# Patient Record
Sex: FEMALE | Race: WHITE
[De-identification: names, ages, dates, MRNs, and addresses within clinical notes are randomized per-mention and may not be internally consistent; named-entity substitution may affect disease eponyms.]

---

## 2017-01-15 ENCOUNTER — HOSPITAL ENCOUNTER (EMERGENCY)
Dept: HOSPITAL 62 - ER | Age: 52
LOS: 1 days | Discharge: HOME | End: 2017-01-16
Payer: COMMERCIAL

## 2017-01-15 DIAGNOSIS — R11.0: ICD-10-CM

## 2017-01-15 DIAGNOSIS — E78.00: ICD-10-CM

## 2017-01-15 DIAGNOSIS — R10.9: ICD-10-CM

## 2017-01-15 DIAGNOSIS — Z90.710: ICD-10-CM

## 2017-01-15 DIAGNOSIS — K21.9: ICD-10-CM

## 2017-01-15 DIAGNOSIS — K59.03: ICD-10-CM

## 2017-01-15 DIAGNOSIS — I10: ICD-10-CM

## 2017-01-15 DIAGNOSIS — R07.89: Primary | ICD-10-CM

## 2017-01-15 LAB
APPEARANCE UR: CLEAR
BASOPHILS # BLD AUTO: 0.1 10^3/UL (ref 0–0.2)
BASOPHILS NFR BLD AUTO: 1.2 % (ref 0–2)
BILIRUB UR QL STRIP: NEGATIVE
EOSINOPHIL # BLD AUTO: 0.3 10^3/UL (ref 0–0.6)
EOSINOPHIL NFR BLD AUTO: 4 % (ref 0–6)
ERYTHROCYTE [DISTWIDTH] IN BLOOD BY AUTOMATED COUNT: 13.4 % (ref 11.5–14)
GLUCOSE UR STRIP-MCNC: NEGATIVE MG/DL
HCT VFR BLD CALC: 39.5 % (ref 36–47)
HGB BLD-MCNC: 12.7 G/DL (ref 12–15.5)
HGB HCT DIFFERENCE: -1.4
KETONES UR STRIP-MCNC: NEGATIVE MG/DL
LYMPHOCYTES # BLD AUTO: 2.5 10^3/UL (ref 0.5–4.7)
LYMPHOCYTES NFR BLD AUTO: 30.8 % (ref 13–45)
MCH RBC QN AUTO: 28.8 PG (ref 27–33.4)
MCHC RBC AUTO-ENTMCNC: 32.2 G/DL (ref 32–36)
MCV RBC AUTO: 90 FL (ref 80–97)
MONOCYTES # BLD AUTO: 0.6 10^3/UL (ref 0.1–1.4)
MONOCYTES NFR BLD AUTO: 8 % (ref 3–13)
NEUTROPHILS # BLD AUTO: 4.5 10^3/UL (ref 1.7–8.2)
NEUTS SEG NFR BLD AUTO: 56 % (ref 42–78)
NITRITE UR QL STRIP: NEGATIVE
PH UR STRIP: 7 [PH] (ref 5–9)
PROT UR STRIP-MCNC: NEGATIVE MG/DL
RBC # BLD AUTO: 4.41 10^6/UL (ref 3.72–5.28)
SP GR UR STRIP: 1.01
UROBILINOGEN UR-MCNC: NEGATIVE MG/DL (ref ?–2)
WBC # BLD AUTO: 8.1 10^3/UL (ref 4–10.5)

## 2017-01-15 PROCEDURE — 81001 URINALYSIS AUTO W/SCOPE: CPT

## 2017-01-15 PROCEDURE — 96374 THER/PROPH/DIAG INJ IV PUSH: CPT

## 2017-01-15 PROCEDURE — 74022 RADEX COMPL AQT ABD SERIES: CPT

## 2017-01-15 PROCEDURE — 96361 HYDRATE IV INFUSION ADD-ON: CPT

## 2017-01-15 PROCEDURE — 99285 EMERGENCY DEPT VISIT HI MDM: CPT

## 2017-01-15 PROCEDURE — 87086 URINE CULTURE/COLONY COUNT: CPT

## 2017-01-15 PROCEDURE — 82550 ASSAY OF CK (CPK): CPT

## 2017-01-15 PROCEDURE — 96375 TX/PRO/DX INJ NEW DRUG ADDON: CPT

## 2017-01-15 PROCEDURE — 82553 CREATINE MB FRACTION: CPT

## 2017-01-15 PROCEDURE — 80307 DRUG TEST PRSMV CHEM ANLYZR: CPT

## 2017-01-15 PROCEDURE — 84484 ASSAY OF TROPONIN QUANT: CPT

## 2017-01-15 PROCEDURE — 83690 ASSAY OF LIPASE: CPT

## 2017-01-15 PROCEDURE — 93010 ELECTROCARDIOGRAM REPORT: CPT

## 2017-01-15 PROCEDURE — 85379 FIBRIN DEGRADATION QUANT: CPT

## 2017-01-15 PROCEDURE — 36415 COLL VENOUS BLD VENIPUNCTURE: CPT

## 2017-01-15 PROCEDURE — 85025 COMPLETE CBC W/AUTO DIFF WBC: CPT

## 2017-01-15 PROCEDURE — 80053 COMPREHEN METABOLIC PANEL: CPT

## 2017-01-15 PROCEDURE — 83735 ASSAY OF MAGNESIUM: CPT

## 2017-01-15 PROCEDURE — 93005 ELECTROCARDIOGRAM TRACING: CPT

## 2017-01-15 NOTE — ER DOCUMENT REPORT
ED General





- General


Chief Complaint: Chest Pain


Stated Complaint: ABDOMINAL PAIN


TRAVEL OUTSIDE OF THE [place] IN LAST 30 DAYS: No





- HPI


Patient complains to provider of: chest pain abdominal pain


Notes: 


Patient coming in for right-sided chest pain abdominal pain starting at 1400 

hrs. today.  Patient states that time to come nitroglycerin tablet relief her 

pain was right-sided patient states pain return now feels like a band around 

her chest and states now she is nauseous.  Patient states she took 3 nitros at 

home 5 minutes apart with no relief in her pain therefore came to ER for 

further evaluation.  Otherwise patient denies a cardiac history denies stents 

by bypass denies any acute MI but states that her physician has prescribed her 

nitro drip home.  Patient does have a history of anxiety elevated blood 

pressure.  Patient states she's had a negative stress tests in the past however 

has been "many years.  Patient denies any trauma denies any recent travel 

denies shortness of breath denies diarrhea.  Patient does state she was 

constipatednoon and had to disimpact herself.





Patient initially states pain is very mild however during interview process the 

nurse attempted an IV stick stated that her chest pain increased to 8 out of 

10.  Pain was right-sided.





- Related Data


Allergies/Adverse Reactions: 


 





No Known Allergies Allergy (Verified 01/15/17 22:47)


 











Past Medical History





- Social History


Smoking Status: Unknown if Ever Smoked


Family History: CAD - Parents and grandparents in their 60s., CVA, Hypertension

, Other - Hypercoagulable state with blood clots





- Past Medical History


Cardiac Medical History: Reports: Hx Hypercholesterolemia, Hx Hypertension


Pulmonary Medical History: Reports: Hx Asthma


GI Medical History: Reports: Hx Gastroesophageal Reflux Disease


Psychiatric Medical History: Reports: Hx Anxiety, Hx Bipolar Disorder, Hx 

Depression


Past Surgical History: Reports: Hx Appendectomy, Hx Hysterectomy





- Immunizations


Hx Diphtheria, Pertussis, Tetanus Vaccination:  - UNKNOWN





Review of Systems





- Review of Systems


Constitutional: No symptoms reported


EENT: No symptoms reported


Cardiovascular: Chest pain - Right-sided


Respiratory: No symptoms reported


Gastrointestinal: Abdominal pain


Genitourinary: No symptoms reported


Female Genitourinary: No symptoms reported


Musculoskeletal: No symptoms reported


Skin: No symptoms reported


Hematologic/Lymphatic: No symptoms reported


Neurological/Psychological: No symptoms reported





Physical Exam





- Vital signs


Vitals: 


 











Resp Pulse Ox


 


 14   96 


 


 01/15/17 23:31  01/15/17 23:31











Interpretation: Normal





- General


General appearance: Appears well, Alert





- HEENT


Head: Normocephalic, Atraumatic


Eyes: Normal


Pupils: PERRL





- Respiratory


Respiratory status: No respiratory distress


Chest status: Nontender


Breath sounds: Normal


Chest palpation: Normal





- Cardiovascular


Rhythm: Regular


Heart sounds: Normal auscultation


Murmur: No





- Abdominal


Inspection: Normal


Distension: No distension


Bowel sounds: Normal


Tenderness: Nontender


Organomegaly: No organomegaly





- Back


Back: Normal, Nontender





- Extremities


General upper extremity: Normal inspection, Nontender, Normal color, Normal ROM

, Normal temperature


General lower extremity: Normal inspection, Nontender, Normal color, Normal ROM

, Normal temperature, Normal weight bearing.  No: Jeff's sign





- Neurological


Neuro grossly intact: Yes


Cognition: Normal


Orientation: AAOx4


Jax Coma Scale Eye Opening: Spontaneous


Jax Coma Scale Verbal: Oriented


[place] Coma Scale Motor: Obeys Commands


[place] Coma Scale Total: 15


Speech: Normal


Motor strength normal: LUE, RUE, LLE, RLE


Sensory: Normal





- Psychological


Associated symptoms: Normal affect, Normal mood





- Skin


Skin Temperature: Warm


Skin Moisture: Dry


Skin Color: Normal





Course





- Re-evaluation


Re-evalutation: 





01/16/17 00:59


Patient EKG troponin returned negative.  D-dimer is also negative.  Patient's x-

ray does show moderate stool burden large amount stool in the right upper 

quadrant and transverse colon.  More likely this is the reasons for the patient'

s pain.  Patient will be given a bottle of mag citrate encouraged to follow-up 

her primary care physician will be discharged home.





The patient presents with abdominal pain without signs of peritonitis or other 

life-threatening or serious etiology. The patient appears stable for discharge 

and has been instructed to return immediately if the symptoms worsen in any way

, or in 8-12hr if not improved for re-evaluation. The patient has been 

instructed to return if the symptoms worsen or change in any way.. 





The patient has atypical chest pain as the patient's chest pain is not 

suggestive of pulmonary embolus, cardiac ischemia, aortic dissection, or other 

serious etiology. Given the extremely low risk of these diagnoses further 

testing and evaluation for these possibilities does not appear to be indicated 

at this time. The patient has been instructed to return if the symptoms worsen 

or change in any way. 





- Vital Signs


Vital signs: 


 











Temp Pulse Resp BP Pulse Ox


 


       14      96 


 


       01/15/17 23:31     01/15/17 23:31














- Laboratory


Result Diagrams: 


 01/15/17 23:19





 01/15/17 23:19





Discharge





- Discharge


Clinical Impression: 


 Right-sided chest wall pain





Constipation


Qualifiers:


 Constipation type: drug induced constipation Qualified Code(s): K59.03 - Drug 

induced constipation





Condition: Good


Disposition: HOME, SELF-CARE


Instructions:  Chest Pain of Unclear Cause ([facility]), Chest Wall Pain ([facility]), 

Abdominal Pain ([facility]), Constipation ([facility])


Additional Instructions: 


Please drink the entire bottle of mag citrate to relieve your constipation.  

This may cause some abdominal cramping and nausea.  May take nausea medication 

as directed.  Please follow-up with your primary care physician.  Your 

laboratory today shows no serious etiology for your right-sided chest pain or 

your abdominal pain.


Forms:  Return to Work

## 2017-01-16 VITALS — SYSTOLIC BLOOD PRESSURE: 105 MMHG | DIASTOLIC BLOOD PRESSURE: 64 MMHG

## 2017-01-16 LAB
ALBUMIN SERPL-MCNC: 4.4 G/DL (ref 3.5–5)
ALP SERPL-CCNC: 68 U/L (ref 38–126)
ALT SERPL-CCNC: 27 U/L (ref 9–52)
ANION GAP SERPL CALC-SCNC: 12 MMOL/L (ref 5–19)
AST SERPL-CCNC: 19 U/L (ref 14–36)
BARBITURATES UR QL SCN: NEGATIVE
BILIRUB DIRECT SERPL-MCNC: 0 MG/DL (ref 0–0.3)
BILIRUB SERPL-MCNC: 0.7 MG/DL (ref 0.2–1.3)
BUN SERPL-MCNC: 16 MG/DL (ref 7–20)
CALCIUM: 9.8 MG/DL (ref 8.4–10.2)
CHLORIDE SERPL-SCNC: 101 MMOL/L (ref 98–107)
CK MB SERPL-MCNC: 1.03 NG/ML (ref ?–4.55)
CK SERPL-CCNC: 101 U/L (ref 30–135)
CO2 SERPL-SCNC: 30 MMOL/L (ref 22–30)
CREAT SERPL-MCNC: 0.88 MG/DL (ref 0.52–1.25)
GLUCOSE SERPL-MCNC: 90 MG/DL (ref 75–110)
LIPASE SERPL-CCNC: 143.1 U/L (ref 23–300)
MAGNESIUM SERPL-MCNC: 2 MG/DL (ref 1.6–2.3)
METHADONE UR QL SCN: NEGATIVE
PCP UR QL SCN: NEGATIVE
POTASSIUM SERPL-SCNC: 4.4 MMOL/L (ref 3.6–5)
PROT SERPL-MCNC: 6.9 G/DL (ref 6.3–8.2)
SODIUM SERPL-SCNC: 142.6 MMOL/L (ref 137–145)
TROPONIN I SERPL-MCNC: < 0.012 NG/ML

## 2018-04-30 ENCOUNTER — HOSPITAL ENCOUNTER (OUTPATIENT)
Dept: HOSPITAL 62 - ER | Age: 53
Setting detail: OBSERVATION
LOS: 2 days | Discharge: HOME | End: 2018-05-02
Attending: INTERNAL MEDICINE | Admitting: INTERNAL MEDICINE
Payer: COMMERCIAL

## 2018-04-30 DIAGNOSIS — I95.9: ICD-10-CM

## 2018-04-30 DIAGNOSIS — Z82.3: ICD-10-CM

## 2018-04-30 DIAGNOSIS — R51: ICD-10-CM

## 2018-04-30 DIAGNOSIS — Z90.49: ICD-10-CM

## 2018-04-30 DIAGNOSIS — Z87.891: ICD-10-CM

## 2018-04-30 DIAGNOSIS — F32.9: ICD-10-CM

## 2018-04-30 DIAGNOSIS — Z83.2: ICD-10-CM

## 2018-04-30 DIAGNOSIS — Z79.899: ICD-10-CM

## 2018-04-30 DIAGNOSIS — F44.81: ICD-10-CM

## 2018-04-30 DIAGNOSIS — R27.0: ICD-10-CM

## 2018-04-30 DIAGNOSIS — F13.20: ICD-10-CM

## 2018-04-30 DIAGNOSIS — Z82.49: ICD-10-CM

## 2018-04-30 DIAGNOSIS — R42: Primary | ICD-10-CM

## 2018-04-30 DIAGNOSIS — H53.8: ICD-10-CM

## 2018-04-30 DIAGNOSIS — F41.1: ICD-10-CM

## 2018-04-30 DIAGNOSIS — E78.5: ICD-10-CM

## 2018-04-30 DIAGNOSIS — Z84.89: ICD-10-CM

## 2018-04-30 DIAGNOSIS — H93.13: ICD-10-CM

## 2018-04-30 LAB
ADD MANUAL DIFF: NO
ALBUMIN SERPL-MCNC: 4 G/DL (ref 3.5–5)
ALP SERPL-CCNC: 76 U/L (ref 38–126)
ALT SERPL-CCNC: 34 U/L (ref 9–52)
ANION GAP SERPL CALC-SCNC: 11 MMOL/L (ref 5–19)
AST SERPL-CCNC: 19 U/L (ref 14–36)
BASOPHILS # BLD AUTO: 0.1 10^3/UL (ref 0–0.2)
BASOPHILS NFR BLD AUTO: 1.3 % (ref 0–2)
BILIRUB DIRECT SERPL-MCNC: 0.2 MG/DL (ref 0–0.4)
BILIRUB SERPL-MCNC: 0.4 MG/DL (ref 0.2–1.3)
BUN SERPL-MCNC: 14 MG/DL (ref 7–20)
CALCIUM: 9.7 MG/DL (ref 8.4–10.2)
CHLORIDE SERPL-SCNC: 104 MMOL/L (ref 98–107)
CK MB SERPL-MCNC: 0.67 NG/ML (ref ?–4.55)
CK SERPL-CCNC: 54 U/L (ref 30–135)
CO2 SERPL-SCNC: 27 MMOL/L (ref 22–30)
EOSINOPHIL # BLD AUTO: 0.2 10^3/UL (ref 0–0.6)
EOSINOPHIL NFR BLD AUTO: 3.3 % (ref 0–6)
ERYTHROCYTE [DISTWIDTH] IN BLOOD BY AUTOMATED COUNT: 13.8 % (ref 11.5–14)
GLUCOSE SERPL-MCNC: 119 MG/DL (ref 75–110)
HCT VFR BLD CALC: 39.4 % (ref 36–47)
HGB BLD-MCNC: 13.3 G/DL (ref 12–15.5)
INR PPP: 0.94
LIPASE SERPL-CCNC: 58.3 U/L (ref 23–300)
LYMPHOCYTES # BLD AUTO: 1.7 10^3/UL (ref 0.5–4.7)
LYMPHOCYTES NFR BLD AUTO: 26.3 % (ref 13–45)
MCH RBC QN AUTO: 29.1 PG (ref 27–33.4)
MCHC RBC AUTO-ENTMCNC: 33.7 G/DL (ref 32–36)
MCV RBC AUTO: 86 FL (ref 80–97)
MONOCYTES # BLD AUTO: 0.5 10^3/UL (ref 0.1–1.4)
MONOCYTES NFR BLD AUTO: 7.5 % (ref 3–13)
NEUTROPHILS # BLD AUTO: 4.1 10^3/UL (ref 1.7–8.2)
NEUTS SEG NFR BLD AUTO: 61.6 % (ref 42–78)
PLATELET # BLD: 173 10^3/UL (ref 150–450)
POTASSIUM SERPL-SCNC: 4.2 MMOL/L (ref 3.6–5)
PROT SERPL-MCNC: 6.7 G/DL (ref 6.3–8.2)
PROTHROMBIN TIME: 13.1 SEC (ref 11.4–15.4)
RBC # BLD AUTO: 4.56 10^6/UL (ref 3.72–5.28)
SODIUM SERPL-SCNC: 142.4 MMOL/L (ref 137–145)
TOTAL CELLS COUNTED % (AUTO): 100 %
TROPONIN I SERPL-MCNC: < 0.012 NG/ML
WBC # BLD AUTO: 6.6 10^3/UL (ref 4–10.5)

## 2018-04-30 PROCEDURE — 83690 ASSAY OF LIPASE: CPT

## 2018-04-30 PROCEDURE — 93010 ELECTROCARDIOGRAM REPORT: CPT

## 2018-04-30 PROCEDURE — 80053 COMPREHEN METABOLIC PANEL: CPT

## 2018-04-30 PROCEDURE — 93005 ELECTROCARDIOGRAM TRACING: CPT

## 2018-04-30 PROCEDURE — 85610 PROTHROMBIN TIME: CPT

## 2018-04-30 PROCEDURE — 81001 URINALYSIS AUTO W/SCOPE: CPT

## 2018-04-30 PROCEDURE — 70544 MR ANGIOGRAPHY HEAD W/O DYE: CPT

## 2018-04-30 PROCEDURE — 80307 DRUG TEST PRSMV CHEM ANLYZR: CPT

## 2018-04-30 PROCEDURE — 70450 CT HEAD/BRAIN W/O DYE: CPT

## 2018-04-30 PROCEDURE — 36415 COLL VENOUS BLD VENIPUNCTURE: CPT

## 2018-04-30 PROCEDURE — G0378 HOSPITAL OBSERVATION PER HR: HCPCS

## 2018-04-30 PROCEDURE — 82553 CREATINE MB FRACTION: CPT

## 2018-04-30 PROCEDURE — 85025 COMPLETE CBC W/AUTO DIFF WBC: CPT

## 2018-04-30 PROCEDURE — 96375 TX/PRO/DX INJ NEW DRUG ADDON: CPT

## 2018-04-30 PROCEDURE — 96374 THER/PROPH/DIAG INJ IV PUSH: CPT

## 2018-04-30 PROCEDURE — 84484 ASSAY OF TROPONIN QUANT: CPT

## 2018-04-30 PROCEDURE — 84439 ASSAY OF FREE THYROXINE: CPT

## 2018-04-30 PROCEDURE — 96361 HYDRATE IV INFUSION ADD-ON: CPT

## 2018-04-30 PROCEDURE — 70553 MRI BRAIN STEM W/O & W/DYE: CPT

## 2018-04-30 PROCEDURE — 84443 ASSAY THYROID STIM HORMONE: CPT

## 2018-04-30 PROCEDURE — 80048 BASIC METABOLIC PNL TOTAL CA: CPT

## 2018-04-30 PROCEDURE — 82550 ASSAY OF CK (CPK): CPT

## 2018-04-30 PROCEDURE — 83735 ASSAY OF MAGNESIUM: CPT

## 2018-04-30 PROCEDURE — 99285 EMERGENCY DEPT VISIT HI MDM: CPT

## 2018-04-30 NOTE — RADIOLOGY REPORT (SQ)
EXAM DESCRIPTION:  CT HEAD WITHOUT



COMPLETED DATE/TIME:  4/30/2018 9:15 pm



REASON FOR STUDY:  dizzy



COMPARISON:  None.



TECHNIQUE:  Axial images acquired through the brain without intravenous contrast.  Images reviewed wi
th bone, brain and subdural windows.  Additional sagittal and coronal reconstructions were generated.
 Images stored on PACS.

All CT scanners at this facility use dose modulation, iterative reconstruction, and/or weight based d
osing when appropriate to reduce radiation dose to as low as reasonably achievable (ALARA).

[facility]: Dose Right  [facility]: CareDose    [facility]: Dose Right    [facility]: Teradose 4D    [facility]: Smart Technologies



RADIATION DOSE:  CT Rad equipment meets quality standard of care and radiation dose reduction techniq
ues were employed. CTDIvol: 53.2 mGy. DLP: 1017 mGy-cm. mGy.



LIMITATIONS:  None.



FINDINGS:  VENTRICLES: Normal size and contour.

CEREBRUM: No masses.  No hemorrhage.  No midline shift.  No evidence for acute infarction. Normal gra
y/white matter differentiation. No areas of low density in the white matter.

CEREBELLUM: No masses.  No hemorrhage.  No alteration of density.  No evidence for acute infarction.

EXTRAAXIAL SPACES: No fluid collections.  No masses.

ORBITS AND GLOBE: No intra- or extraconal masses.  Normal contour of globe without masses.

CALVARIUM: No fracture.

PARANASAL SINUSES: No fluid or mucosal thickening.

SOFT TISSUES: No mass or hematoma.

OTHER: No other significant finding.



IMPRESSION:  NORMAL BRAIN CT WITHOUT CONTRAST.

EVIDENCE OF ACUTE STROKE: NO.



COMMENT:  Quality ID # [id number]: Final reports with documentation of one or more dose reduction techniques
 (e.g., Automated exposure control, adjustment of the mA and/or kV according to patient size, use of 
iterative reconstruction technique)



TECHNICAL DOCUMENTATION:  JOB ID:  [id number]

 2011 [employer]- All Rights Reserved



Reading location - IP/workstation name: RO

## 2018-04-30 NOTE — ER DOCUMENT REPORT
ED General





- General


TRAVEL OUTSIDE OF THE [place] IN LAST 30 DAYS: No





- HPI


Patient complains to provider of: Dizziness





<SEBASTIAN CORBIN - Last Filed: 04/30/18 23:27>





<MARCELO BEST - Last Filed: 05/01/18 02:58>





- General


Chief Complaint: Dizziness


Stated Complaint: DIZZINESS


Time Seen by Provider: 04/30/18 20:25





- HPI


Notes: 





History this morning.  Patient states also having a ringing in both ears.  

Patient has a history of having symptoms before denies history of vertigo or 

ringing in her ear.  Denies any recent travel or travel or swimming or diving.  

Denies any drainage from her ear or ear pain.  Patient states feels like wind 

blowing in her ears.  Patient states her dizziness is exacerbated with sitting 

up and movement.  Patient states he has been eating and drinking regularly.  

Patient also states has a past medical history positive for hypothyroidism 

depression multiple personalities.  Patient states that she is unaware when she 

had a today is that sometimes the other personalities will have meals that she 

is unaware of.  Denies fevers chills nausea vomiting diarrhea chest pain 

abdominal pain patient is resting healthy upon my evaluation. (SEBASTIAN CORBIN)





- Related Data


Allergies/Adverse Reactions: 


 





No Known Allergies Allergy (Verified 01/15/17 22:47)


 











Past Medical History





- Social History


Smoking Status: Former Smoker


Chew tobacco use (# tins/day): No


Frequency of alcohol use: None


Drug Abuse: None


Family History: CAD - Parents and grandparents in their 60s., CVA, Hypertension

, Other - Hypercoagulable state with blood clots


Patient has suicidal ideation: No


Patient has homicidal ideation: No





- Past Medical History


Cardiac Medical History: Reports: Hx Hypercholesterolemia, Hx Hypertension


Pulmonary Medical History: Reports: Hx Asthma


Renal/ Medical History: Denies: Hx Peritoneal Dialysis


GI Medical History: Reports: Hx Gastroesophageal Reflux Disease


Psychiatric Medical History: Reports: Hx Anxiety, Hx Bipolar Disorder, Hx 

Depression


Past Surgical History: Reports: Hx Appendectomy, Hx Hysterectomy





- Immunizations


Hx Diphtheria, Pertussis, Tetanus Vaccination:  - UNKNOWN





<SEBASTIAN CORBIN - Last Filed: 04/30/18 23:27>





Review of Systems





- Review of Systems


Constitutional: No symptoms reported, Weakness


EENT: No symptoms reported


Cardiovascular: No symptoms reported


Respiratory: No symptoms reported


Gastrointestinal: No symptoms reported


Genitourinary: No symptoms reported


Female Genitourinary: No symptoms reported


Musculoskeletal: No symptoms reported


Skin: No symptoms reported


Hematologic/Lymphatic: No symptoms reported


Neurological/Psychological: Other - Dizziness


-: Yes All other systems reviewed and negative





<SEBASTIAN CORBIN - Last Filed: 04/30/18 23:27>





Physical Exam





- Vital signs


Interpretation: Normal





- General


General appearance: Appears well, Alert





- HEENT


Head: Normocephalic, Atraumatic


Eyes: Normal


Conjunctiva: Normal


Cornea: Normal


Extraocular movements intact: Yes


Eyelashes: Normal


Pupils: PERRL


Ears: Normal


External canal: Normal


Tympanic membrane: Normal


Sinus: Normal


Nasal: Normal


Mouth/Lips: Normal


Mucous membranes: Normal


Pharynx: Normal


Neck: Normal





- Respiratory


Respiratory status: No respiratory distress


Chest status: Nontender


Breath sounds: Normal


Chest palpation: Normal





- Cardiovascular


Rhythm: Regular


Heart sounds: Normal auscultation


Murmur: No





- Abdominal


Inspection: Normal


Distension: No distension


Bowel sounds: Normal


Tenderness: Nontender


Organomegaly: No organomegaly





- Back


Back: Normal, Nontender





- Extremities


General upper extremity: Normal inspection, Nontender, Normal color, Normal ROM

, Normal temperature


General lower extremity: Normal inspection, Nontender, Normal color, Normal ROM

, Normal temperature, Normal weight bearing.  No: Jeff's sign





- Neurological


Neuro grossly intact: Yes


Cognition: Normal


Orientation: AAOx4


[place] Coma Scale Eye Opening: Spontaneous


[place] Coma Scale Verbal: Oriented


[place] Coma Scale Motor: Obeys Commands


[place] Coma Scale Total: 15


Speech: Normal


Cranial nerves: Normal


Motor strength normal: LUE, RUE, LLE, RLE


Sensory: Normal


Knee - Reflex grade: 2 = Normal





- Psychological


Associated symptoms: Normal affect, Normal mood





- Skin


Skin Temperature: Warm


Skin Moisture: Dry


Skin Color: Normal





<SEBASTIAN CORBIN - Last Filed: 04/30/18 23:27>





- Vital signs


Vitals: 


 











Temp Pulse Resp BP Pulse Ox


 


 98.5 F   65   18   118/81   93 


 


 04/30/18 19:40  04/30/18 19:40  04/30/18 19:40  04/30/18 19:40  04/30/18 19:40














Course





- Laboratory


Result Diagrams: 


 04/30/18 20:50





 04/30/18 20:50





<SEBASTIAN CORBIN - Last Filed: 04/30/18 23:27>





- Laboratory


Result Diagrams: 


 04/30/18 20:50





 04/30/18 20:50





<MARCELO BEST - Last Filed: 05/01/18 02:58>





- Re-evaluation


Re-evalutation: 





04/30/18 23:17


Orthostatics are positive on patient reproducing the patient's symptoms.  

Otherwise laboratory studies are negative.  Currently waiting on urinalysis.  

Ringing in ears more likely labyrinthitis or Mnire's no other critical 

pathology seen on examination.  Patient was given Antivert.  Patient is already 

on 10 mg of Valium daily twice daily.





We will continue to monitor patient with disposition will likely will be home


04/30/18 23:27


Patient now resting comfortably on her side stating that she feels better this 

complain of slight headache.  Patient more likely has orthostatic dizziness 

encouraged the patient to stay hydrated.  We will continue with meclizine 

patient will be discharged home. (SEBASTIAN CORBIN)








05/01/18 00:34


Patient was going to be discharged.  Patient has history of some psychiatric 

disorder.  She actually presents with vertigo type dizziness and ringing in her 

ears.  It is worse when she sits up.  She was given meclizine apparently was 

feeling some better but when they went to discharge her she was still having a 

lot of dizziness was unable to sit up on her own.  Her blood pressure was also 

a bit low.  I just her cuff recheck her blood pressure in her blood pressure is 

okay however when I go to set her up she becomes very dizzy is unable to 

balance herself.  She does admit that she does not had any of her nighttime 

medications which include Valium 10 mg.  I will have her take her nighttime 

medications.  I will continue monitor and reassess her. 


05/01/18 02:54





She continues to have intermittent episodes of hypotension.  She continues to 

be very dizzy when she sits up.  I think this most likely is going to be 

medication related.  To the fact that she appeared continues to have recurrent 

bouts of hypotension and dizziness to the point where she cannot get out of bed 

I have spoke with the hospitalist, Dr. Maldonado, who agrees to admit the patient.





Dictation of this chart was performed using voice recognition software; 

therefore, there may be some unintended grammatical errors. (MARCELO BEST)





- Vital Signs


Vital signs: 


 











Temp Pulse Resp BP Pulse Ox


 


 98.5 F   64   10 L  87/55 L  94 


 


 04/30/18 19:40  04/30/18 21:34  05/01/18 00:23  05/01/18 00:23  05/01/18 00:23














- Laboratory


Laboratory results interpreted by me: 


 











  04/30/18 05/01/18 05/01/18





  20:50 00:35 00:54


 


Glucose  119 H  


 


Ur Leukocyte Esterase    LARGE H


 


Salicylates   < 1.0 L 














Discharge





<SEBASTIAN CORBIN - Last Filed: 04/30/18 23:27>





- Discharge


Unit Admitted: Telemetry





<MARCELO BEST - Last Filed: 05/01/18 02:58>





- Discharge


Clinical Impression: 


 Dizziness





Hypotension


Qualifiers:


 Hypotension type: unspecified hypotension type Qualified Code(s): I95.9 - 

Hypotension, unspecified





Condition: Stable


Disposition: ADMITTED AS OBSERVATION


Additional Instructions: 


Your laboratory values today did not show any critical pathology.  Her vital 

signs do show signs of orthostasis more likely causing her dizziness whenever 

he change position your blood pressure does not respond correctly you have a 

low blood pressure which causes the dizziness.  Most on this is caused by 

dehydration.  Please make sure you are drinking plenty of fluids.  We will also 

start you on a medication called Antivert to help out with the dizziness.  

Follow-up with your primary care physician return to ER symptoms worsen


Prescriptions: 


Meclizine HCl [Antivert 25 mg Tablet] 25 mg PO TID PRN #21 tablet


 PRN Reason: 


Referrals: 


PATRICIA MCKEON MD [Primary Care Provider] - Follow up in 3-5 days

## 2018-05-01 LAB
APPEARANCE UR: (no result)
APTT PPP: YELLOW S
BILIRUB UR QL STRIP: NEGATIVE
FREE T4 (FREE THYROXINE): 1.01 NG/DL (ref 0.78–2.19)
GLUCOSE UR STRIP-MCNC: NEGATIVE MG/DL
KETONES UR STRIP-MCNC: NEGATIVE MG/DL
NITRITE UR QL STRIP: NEGATIVE
PH UR STRIP: 5 [PH] (ref 5–9)
PROT UR STRIP-MCNC: NEGATIVE MG/DL
SP GR UR STRIP: 1.01
TSH SERPL-ACNC: 15 UIU/ML (ref 0.47–4.68)
UROBILINOGEN UR-MCNC: NEGATIVE MG/DL (ref ?–2)

## 2018-05-01 RX ADMIN — HEPARIN SODIUM SCH UNIT: 5000 INJECTION, SOLUTION INTRAVENOUS; SUBCUTANEOUS at 06:37

## 2018-05-01 RX ADMIN — SODIUM CHLORIDE PRN ML: 9 INJECTION, SOLUTION INTRAVENOUS at 10:10

## 2018-05-01 RX ADMIN — DOCUSATE SODIUM SCH MG: 100 CAPSULE, LIQUID FILLED ORAL at 18:12

## 2018-05-01 RX ADMIN — HEPARIN SODIUM SCH UNIT: 5000 INJECTION, SOLUTION INTRAVENOUS; SUBCUTANEOUS at 14:39

## 2018-05-01 RX ADMIN — HEPARIN SODIUM SCH UNIT: 5000 INJECTION, SOLUTION INTRAVENOUS; SUBCUTANEOUS at 21:38

## 2018-05-01 RX ADMIN — SODIUM CHLORIDE PRN ML: 9 INJECTION, SOLUTION INTRAVENOUS at 05:23

## 2018-05-01 RX ADMIN — DOCUSATE SODIUM SCH MG: 100 CAPSULE, LIQUID FILLED ORAL at 10:02

## 2018-05-01 RX ADMIN — FLUTICASONE PROPIONATE AND SALMETEROL SCH INH: 50; 250 POWDER RESPIRATORY (INHALATION) at 18:13

## 2018-05-01 RX ADMIN — ASPIRIN SCH MG: 81 TABLET, CHEWABLE ORAL at 10:02

## 2018-05-01 RX ADMIN — LEVOTHYROXINE SODIUM SCH MG: 88 TABLET ORAL at 12:19

## 2018-05-01 NOTE — PROGRESS NOTE
Provider Note


Provider Note: 





Agree with nocturnist plan of care.





1.  Dizziness: Initiate meclizine 12.5 mg p.o. every 8 hours.  Following IVF 

resuscitation, the patient is unable to ambulate consider MRI/MRA brain to 

evaluate for lesion, tumor, CVA to explain persistent symptoms.





2.  Hypotension: Likely multifactorial, dehydration in the setting of Valium 

use may explain her HYPOtension.  Blood pressure did respond to fluid challenge 

in the emergency department.  Administered 2 L IVF bolus and continue 150 mL/h 

in an attempt to rehydrate.  Will attempt orthostatic vital signs and 

ambulation following IVF resuscitation.





3.  Anxiety/depression: The patient is currently on a number of psychiatric 

medications, many of which are nonformulary at [facility].  The patient may resume 

taking her home medications.  Per pharmacy policy the patient must turn over 

her medications to nursing staff, which will be kept in a secure location, and 

dispensed according to her home regimen schedule.

## 2018-05-01 NOTE — PDOC H&P
History of Present Illness


Admission Date/PCP: 


  05/01/18 02:54





  PATRICIA MCKEON MD





Patient complains of: Dizziness


History of Present Illness: 


JAKOB LYNCH is a 53 year old female with history of morbid obesity, vertigo

, COPD, benzodiazepine dependent anxiety and depression.  Patient presents with 

spinning and ringing in her ears worse when she sits up a trial of meclizine 

improved symptoms.  Upon reevaluation patient was hypotensive and admits to 

taking her home Valium.  She receives an IV fluid challenge but remains 

orthostatic with odd affect.  She is referred to the hospitalist for 

observation.  Patient denies recent change in medications admits last panic 

attack approximately a month ago.





Past Medical History


Cardiac Medical History: Reports: Hyperlipidema, Hypertension


Pulmonary Medical History: Reports: Asthma


Neurological Medical History: Reports: Other - Vertigo


GI Medical History: Reports: Gastroesophageal Reflux Disease


Psychiatric Medical History: Reports: Bipolar Disorder, Depression, General 

Anxiety Disorder





Past Surgical History


Past Surgical History: Reports: Appendectomy, Hysterectomy





Social History


Information Source: Patient, [facility] Records


Smoking Status: Former Smoker


Frequency of Alcohol Use: None


Hx Recreational Drug Use: No


Drugs: None





- Advance Directive


Resuscitation Status: Full Code





Family History


Family History: CAD - Parents and grandparents in their 60s., CVA, Hypertension

, Other - Hypercoagulable state with blood clots


Parental Family History Reviewed: Yes


Children Family History Reviewed: Yes


Sibling(s) Family History Reviewed.: Yes





Medication/Allergy


Home Medications: 








Diazepam 1 tab PO ASDIR 10/30/16 


Venlafaxine HCl [Venlafaxine HCl ER] 1 cap PO BID 10/30/16 


Aspirin [Aspirin 81 mg Chewable Tablet] 81 mg PO DAILY  tab.chew 10/31/16 


Esomeprazole Mag Trihydrate [Nexium] 40 mg PO DAILY #30 capsule. 10/31/16 


Fluticasone/Salmeterol [Advair 250-50 Diskus 14 Dose/Diskus] 1 inh IH BID  

inhaler 10/31/16 


Montelukast Sodium [Singulair 10 mg Tablet] 10 mg PO DAILY  tablet 10/31/16 


Sulfamethoxazole/Trimethoprim [Bactrim Ds Tablet] 1 each PO BID #6 tablet 10/31/

16 


Meclizine HCl [Antivert 25 mg Tablet] 25 mg PO TID PRN #21 tablet 04/30/18 








Allergies/Adverse Reactions: 


 





No Known Allergies Allergy (Verified 01/15/17 22:47)


 











Review of Systems


Constitutional: ABSENT: chills, fever(s), headache(s), weight gain, weight loss


Eyes: ABSENT: visual disturbances


Ears: ABSENT: hearing changes


Cardiovascular: ABSENT: chest pain, dyspnea on exertion, edema, orthropnea, 

palpitations


Respiratory: ABSENT: cough, hemoptysis


Gastrointestinal: ABSENT: abdominal pain, constipation, diarrhea, hematemesis, 

hematochezia, nausea, vomiting


Genitourinary: ABSENT: dysuria, hematuria


Musculoskeletal: ABSENT: joint swelling


Integumentary: ABSENT: rash, wounds


Neurological: ABSENT: abnormal gait, abnormal speech, confusion, dizziness, 

focal weakness, syncope


Psychiatric: ABSENT: anxiety, depression, homidical ideation, suicidal ideation


Endocrine: ABSENT: cold intolerance, heat intolerance, polydipsia, polyuria


Hematologic/Lymphatic: ABSENT: easy bleeding, easy bruising





Physical Exam


Vital Signs: 


 











Temp Pulse Resp BP Pulse Ox


 


 98.5 F   57 L  21 H  84/58 L  97 


 


 04/30/18 19:40  05/01/18 04:00  05/01/18 06:00  05/01/18 05:01  05/01/18 06:00











General appearance: PRESENT: no acute distress, well-developed, well-nourished


Head exam: PRESENT: atraumatic, normocephalic


Eye exam: PRESENT: conjunctiva pink, EOMI, PERRLA.  ABSENT: scleral icterus


Ear exam: PRESENT: normal external ear exam


Mouth exam: PRESENT: moist, tongue midline


Neck exam: ABSENT: carotid bruit, JVD, lymphadenopathy, thyromegaly


Respiratory exam: PRESENT: clear to auscultation yenny.  ABSENT: rales, rhonchi, 

wheezes


Cardiovascular exam: PRESENT: RRR.  ABSENT: diastolic murmur, rubs, systolic 

murmur


Pulses: PRESENT: normal dorsalis pedis pul


Vascular exam: PRESENT: normal capillary refill


GI/Abdominal exam: PRESENT: normal bowel sounds, soft.  ABSENT: distended, 

guarding, mass, organolmegaly, rebound, tenderness


Rectal exam: PRESENT: deferred


Extremities exam: PRESENT: full ROM.  ABSENT: calf tenderness, clubbing, pedal 

edema


Neurological exam: PRESENT: alert, awake, oriented to person, oriented to place

, oriented to time, oriented to situation, CN II-XII grossly intact.  ABSENT: 

motor sensory deficit


Psychiatric exam: PRESENT: depressed, unusual affect.  ABSENT: homicidal 

ideation, suicidal ideation


Focused psych exam: PRESENT: other - Sedated


Skin exam: PRESENT: dry, intact, warm.  ABSENT: cyanosis, rash





Results


Impressions: 


 





Head CT  04/30/18 20:35


IMPRESSION:  NORMAL BRAIN CT WITHOUT CONTRAST.


EVIDENCE OF ACUTE STROKE: NO.


 














Assessment & Plan





- Diagnosis


(1) Dizziness


Is this a current diagnosis for this admission?: Yes   


Plan: 


Acute on chronic meclizine as needed








(2) Hypotension


Qualifiers: 


   Hypotension type: unspecified hypotension type   Qualified Code(s): I95.9 - 

Hypotension, unspecified   


Is this a current diagnosis for this admission?: Yes   


Plan: 


Secondary to benzodiazepine.  IV fluid challenge, supportive care reduction 

dose and education








(3) Anxiety and depression


Is this a current diagnosis for this admission?: Yes   


Plan: 


Denies suicidal or homicidal ideation, follow-up outpatient mental health








- Time


Time Spent: 30 to 50 Minutes

## 2018-05-02 VITALS — DIASTOLIC BLOOD PRESSURE: 76 MMHG | SYSTOLIC BLOOD PRESSURE: 125 MMHG

## 2018-05-02 LAB
ADD MANUAL DIFF: NO
ANION GAP SERPL CALC-SCNC: 9 MMOL/L (ref 5–19)
BASOPHILS # BLD AUTO: 0.1 10^3/UL (ref 0–0.2)
BASOPHILS NFR BLD AUTO: 0.9 % (ref 0–2)
BUN SERPL-MCNC: 9 MG/DL (ref 7–20)
CALCIUM: 9.5 MG/DL (ref 8.4–10.2)
CHLORIDE SERPL-SCNC: 108 MMOL/L (ref 98–107)
CO2 SERPL-SCNC: 27 MMOL/L (ref 22–30)
EOSINOPHIL # BLD AUTO: 0.2 10^3/UL (ref 0–0.6)
EOSINOPHIL NFR BLD AUTO: 4 % (ref 0–6)
ERYTHROCYTE [DISTWIDTH] IN BLOOD BY AUTOMATED COUNT: 13.6 % (ref 11.5–14)
GLUCOSE SERPL-MCNC: 101 MG/DL (ref 75–110)
HCT VFR BLD CALC: 38.2 % (ref 36–47)
HGB BLD-MCNC: 13 G/DL (ref 12–15.5)
LYMPHOCYTES # BLD AUTO: 1.8 10^3/UL (ref 0.5–4.7)
LYMPHOCYTES NFR BLD AUTO: 32.7 % (ref 13–45)
MCH RBC QN AUTO: 29.2 PG (ref 27–33.4)
MCHC RBC AUTO-ENTMCNC: 34 G/DL (ref 32–36)
MCV RBC AUTO: 86 FL (ref 80–97)
MONOCYTES # BLD AUTO: 0.4 10^3/UL (ref 0.1–1.4)
MONOCYTES NFR BLD AUTO: 7.7 % (ref 3–13)
NEUTROPHILS # BLD AUTO: 3.1 10^3/UL (ref 1.7–8.2)
NEUTS SEG NFR BLD AUTO: 54.7 % (ref 42–78)
PLATELET # BLD: 164 10^3/UL (ref 150–450)
POTASSIUM SERPL-SCNC: 4 MMOL/L (ref 3.6–5)
RBC # BLD AUTO: 4.45 10^6/UL (ref 3.72–5.28)
SODIUM SERPL-SCNC: 143.6 MMOL/L (ref 137–145)
TOTAL CELLS COUNTED % (AUTO): 100 %
WBC # BLD AUTO: 5.6 10^3/UL (ref 4–10.5)

## 2018-05-02 RX ADMIN — FLUTICASONE PROPIONATE AND SALMETEROL SCH INH: 50; 250 POWDER RESPIRATORY (INHALATION) at 06:15

## 2018-05-02 RX ADMIN — DOCUSATE SODIUM SCH MG: 100 CAPSULE, LIQUID FILLED ORAL at 09:54

## 2018-05-02 RX ADMIN — LEVOTHYROXINE SODIUM SCH MG: 88 TABLET ORAL at 06:14

## 2018-05-02 RX ADMIN — HEPARIN SODIUM SCH UNIT: 5000 INJECTION, SOLUTION INTRAVENOUS; SUBCUTANEOUS at 06:17

## 2018-05-02 RX ADMIN — ASPIRIN SCH MG: 81 TABLET, CHEWABLE ORAL at 09:55

## 2018-05-02 NOTE — RADIOLOGY REPORT (SQ)
EXAM DESCRIPTION:  MRI HEAD COMBO; MRA HEAD WITHOUT



COMPLETED DATE/TIME:  5/1/2018 8:35 pm



REASON FOR STUDY:  dizziness   L blurry vision. r/o cva lesion tumor I67.81  ACUTE CEREBROVASCULAR IN
SUFFICIENCY



COMPARISON:  CT brain 4/30/2018



TECHNIQUE:  Multiplanar imaging includes noncontrasted T1, T2, FLAIR, diffusion with ADC map and post
gadolinium contrast T1 sequences. Images stored on PACS.

3D time-of-flight [place] MRA exam was performed.  Source data and maximum intensity project
ed images were reviewed.



CONTRAST TYPE AND DOSE:  20 mL Multihance.



RENAL FUNCTION:  GFR > 60.



LIMITATIONS:  None.



FINDINGS:  ANATOMY: No anomalies. Normal vascular flow voids. Pituitary fossa normal.

CSF SPACES: Normal in size and contour. No hemorrhage.

CEREBRUM: Sulci and gyri normal in size and contour. Normal white matter signal on FLAIR imaging. No 
evidence of hemorrhage, mass, or extraaxial fluid collection. No abnormal enhancement post contrast.

POSTERIOR FOSSA: No signal alteration. No hemorrhage. No edema, masses, or mass effect. Internal bam
tory canals, cerebellopontine angles, mastoids normal. No enhancing lesions. No abnormal enhancement 
post contrast.

DIFFUSION IMAGING: Negative for acute or subacute infarction.

ORBITS: No masses. Globes normal.

PARANASAL SINUSES: No fluid levels. Mucosa normal.

[place] MRA: No [place] stenosis, vascular malformation, or aneurysm No other signi
ficant finding.



IMPRESSION:  NORMAL MRI and MRA OF THE BRAIN WITHOUT AND WITH INTRAVENOUS GADOLINIUM CONTRAST.

EVIDENCE OF ACUTE STROKE: NO.



TECHNICAL DOCUMENTATION:  JOB ID:  [id number]

 2011 [employer]- All Rights Reserved



Reading location - IP/workstation name: Counts include 234 beds at the [facility]

## 2018-05-02 NOTE — RADIOLOGY REPORT (SQ)
EXAM DESCRIPTION:  MRI HEAD COMBO; MRA HEAD WITHOUT



COMPLETED DATE/TIME:  5/1/2018 8:35 pm



REASON FOR STUDY:  dizziness   L blurry vision. r/o cva lesion tumor I67.81  ACUTE CEREBROVASCULAR IN
SUFFICIENCY



COMPARISON:  CT brain 4/30/2018



TECHNIQUE:  Multiplanar imaging includes noncontrasted T1, T2, FLAIR, diffusion with ADC map and post
gadolinium contrast T1 sequences. Images stored on PACS.

3D time-of-flight [place] MRA exam was performed.  Source data and maximum intensity project
ed images were reviewed.



CONTRAST TYPE AND DOSE:  20 mL Multihance.



RENAL FUNCTION:  GFR > 60.



LIMITATIONS:  None.



FINDINGS:  ANATOMY: No anomalies. Normal vascular flow voids. Pituitary fossa normal.

CSF SPACES: Normal in size and contour. No hemorrhage.

CEREBRUM: Sulci and gyri normal in size and contour. Normal white matter signal on FLAIR imaging. No 
evidence of hemorrhage, mass, or extraaxial fluid collection. No abnormal enhancement post contrast.

POSTERIOR FOSSA: No signal alteration. No hemorrhage. No edema, masses, or mass effect. Internal bam
tory canals, cerebellopontine angles, mastoids normal. No enhancing lesions. No abnormal enhancement 
post contrast.

DIFFUSION IMAGING: Negative for acute or subacute infarction.

ORBITS: No masses. Globes normal.

PARANASAL SINUSES: No fluid levels. Mucosa normal.

[place] MRA: No [place] stenosis, vascular malformation, or aneurysm No other signi
ficant finding.



IMPRESSION:  NORMAL MRI and MRA OF THE BRAIN WITHOUT AND WITH INTRAVENOUS GADOLINIUM CONTRAST.

EVIDENCE OF ACUTE STROKE: NO.



TECHNICAL DOCUMENTATION:  JOB ID:  [id number]

 2011 [employer]- All Rights Reserved



Reading location - IP/workstation name: [facility]

## 2018-05-08 NOTE — PDOC DISCHARGE SUMMARY
General





- Admit/Disc Date/PCP


Admission Date/Primary Care Provider: 


  05/01/18 02:54





  PATRICIA MCKEON MD





Discharge Date: 05/02/18





- Discharge Diagnosis


(1) Dizziness


Is this a current diagnosis for this admission?: Yes   


Summary: 


The patient presented with dizziness, blurry vision, and ataxia


These are all side effect of Fanapt, a psych medication perscribed to the 

patient


The patient also takes Valium, it is possible her polypharmacy played a part in 

her symptoms of dizziness


Following IVF resuscitation, the patient was still unable to ambulate. 


Initiated meclizine 12.5 mg p.o. every 8 hours.  


MRI/MRA brain to evaluate for lesion, tumor, CVA to explain persistent 

symptoms. Results negative


Following 48hrs in the hospital, the patient's symptoms had subsided, she was 

able to ambulate. Encouraged the patient to talk to her psychiatrist about 

replacing her Fanapt. Discouraged her from taking Valium unless absolutely 

necessary 














(2) Hypotension


Is this a current diagnosis for this admission?: Yes   


Summary: 


Likely multifactorial, dehydration in the setting of Valium use may explain her 

HYPOtension.  Blood pressure did respond to fluid challenge in the emergency 

department.  Administered 2 L IVF bolus and continue 150 mL/h for 24 hours to 

rehydrate.  Prior to discharge, orthostatic vital signs were normal and no 

difficulty with ambulation 








(3) Anxiety and depression


Is this a current diagnosis for this admission?: Yes   


Summary: 


The patient is on a number of psychiatric medications, many of which are 

nonformulary at [facility].  


The patient resumd taking her home medication, with the exception of Fanapt. 


It is possible that the Fanapt resulted in her dizziness, blurry vision and 

ataxia. In addition to her Fanapt the patient also takes Valium. Her 

polypharmacy could be the likely culprit of her symptoms. The patient was 

encouraged to only take valium as needed, instead of everyday like she had 

been. Additionally, she was encouraged to talk to her psychiatrist about 

switching Fanapt to another medication.   








- Additional Information


Resuscitation Status: Full Code


Discharge Diet: As Tolerated


Discharge Activity: Activity As Tolerated, Balance Activity w/Rest


Prescriptions: 


Meclizine HCl [Antivert 12.5 mg Tablet] 12.5 mg PO Q8HP PRN #25 tablet


 PRN Reason: 


Home Medications: 








Diazepam [Valium] 10 mg PO Q12 05/01/18 


Ergocalciferol (Vitamin D2) [Drisdol 50,000 unit (1.25MG) Capsule] 50,000 units 

PO WHITEHEAD@1000 05/01/18 


Esomeprazole Magnesium [Nexium] 40 mg PO BID 05/01/18 


Fluticasone/Salmeterol [Advair 250-50 Diskus 14 Dose/Diskus] 1 puff IH Q12 05/01 /18 


Gabapentin [Neurontin] 600 mg PO Q6 05/01/18 


Levothyroxine Sodium [Synthroid 0.088 mg Tablet] 0.088 mg PO Q6AM 05/01/18 


Metaxalone [Skelaxin 800 mg Tablet] 800 mg PO Q8HP PRN 05/01/18 


Montelukast Sodium [Singulair 10 mg Tablet] 10 mg PO QHS 05/01/18 


Pravastatin Sodium [Pravachol] 20 mg PO DAILY 05/01/18 


Prazosin HCl [Minipress] 1 mg PO QHS 05/01/18 


Vortioxetine Hydrobromide [Trintellix] 10 mg PO DAILY 05/01/18 


Zolpidem Tartrate [Ambien] 10 mg PO QHS 05/01/18 


Meclizine HCl [Antivert 12.5 mg Tablet] 12.5 mg PO Q8HP PRN #25 tablet 05/02/18 











History of Present Illness


History of Present Illness: 


JAKOB LYNCH is a 53 year old female








Hospital Course


Hospital Course: 





as abOVE





Physical Exam


Vital Signs: 


 











Temp Pulse Resp BP Pulse Ox


 


 97.8 F   66   12   125/76   97 


 


 05/02/18 11:31  05/02/18 11:31  05/02/18 11:31  05/02/18 11:31  05/02/18 11:31














Results


Laboratory Results: 


 





 05/02/18 06:05 





 05/02/18 06:05 








Impressions: 


 





Head CT  04/30/18 20:35


IMPRESSION:  NORMAL BRAIN CT WITHOUT CONTRAST.


EVIDENCE OF ACUTE STROKE: NO.


 








Brain MRI with MRA  05/01/18 00:00


IMPRESSION:  NORMAL MRI and MRA OF THE BRAIN WITHOUT AND WITH INTRAVENOUS 

GADOLINIUM CONTRAST.


EVIDENCE OF ACUTE STROKE: NO.


 








Head MRI  05/01/18 00:00


IMPRESSION:  NORMAL MRI and MRA OF THE BRAIN WITHOUT AND WITH INTRAVENOUS 

GADOLINIUM CONTRAST.


EVIDENCE OF ACUTE STROKE: NO.


 














Qualifiers





- *


PATIENT BEING DISCHARGED WITH ANY OF THE FOLLOWING DIAGNOSIS: No

## 2019-04-20 ENCOUNTER — HOSPITAL ENCOUNTER (EMERGENCY)
Dept: HOSPITAL 62 - ER | Age: 54
Discharge: HOME | End: 2019-04-20
Payer: COMMERCIAL

## 2019-04-20 VITALS — SYSTOLIC BLOOD PRESSURE: 135 MMHG | DIASTOLIC BLOOD PRESSURE: 78 MMHG

## 2019-04-20 DIAGNOSIS — R20.2: Primary | ICD-10-CM

## 2019-04-20 DIAGNOSIS — I10: ICD-10-CM

## 2019-04-20 DIAGNOSIS — E03.9: ICD-10-CM

## 2019-04-20 DIAGNOSIS — Z87.891: ICD-10-CM

## 2019-04-20 DIAGNOSIS — R05: ICD-10-CM

## 2019-04-20 DIAGNOSIS — Z79.899: ICD-10-CM

## 2019-04-20 DIAGNOSIS — J45.909: ICD-10-CM

## 2019-04-20 DIAGNOSIS — R42: ICD-10-CM

## 2019-04-20 DIAGNOSIS — R09.81: ICD-10-CM

## 2019-04-20 DIAGNOSIS — R09.82: ICD-10-CM

## 2019-04-20 LAB
ADD MANUAL DIFF: NO
ALBUMIN SERPL-MCNC: 4.6 G/DL (ref 3.5–5)
ALP SERPL-CCNC: 85 U/L (ref 38–126)
ALT SERPL-CCNC: 28 U/L (ref 9–52)
ANION GAP SERPL CALC-SCNC: 9 MMOL/L (ref 5–19)
APPEARANCE UR: CLEAR
APTT PPP: YELLOW S
AST SERPL-CCNC: 16 U/L (ref 14–36)
BASOPHILS # BLD AUTO: 0.1 10^3/UL (ref 0–0.2)
BASOPHILS NFR BLD AUTO: 1.3 % (ref 0–2)
BILIRUB DIRECT SERPL-MCNC: 0.3 MG/DL (ref 0–0.4)
BILIRUB SERPL-MCNC: 0.7 MG/DL (ref 0.2–1.3)
BILIRUB UR QL STRIP: NEGATIVE
BUN SERPL-MCNC: 15 MG/DL (ref 7–20)
CALCIUM: 10.5 MG/DL (ref 8.4–10.2)
CHLORIDE SERPL-SCNC: 99 MMOL/L (ref 98–107)
CO2 SERPL-SCNC: 31 MMOL/L (ref 22–30)
EOSINOPHIL # BLD AUTO: 0.2 10^3/UL (ref 0–0.6)
EOSINOPHIL NFR BLD AUTO: 2.1 % (ref 0–6)
ERYTHROCYTE [DISTWIDTH] IN BLOOD BY AUTOMATED COUNT: 13.4 % (ref 11.5–14)
GLUCOSE SERPL-MCNC: 121 MG/DL (ref 75–110)
GLUCOSE UR STRIP-MCNC: NEGATIVE MG/DL
HCT VFR BLD CALC: 41.2 % (ref 36–47)
HGB BLD-MCNC: 14 G/DL (ref 12–15.5)
KETONES UR STRIP-MCNC: NEGATIVE MG/DL
LYMPHOCYTES # BLD AUTO: 2.6 10^3/UL (ref 0.5–4.7)
LYMPHOCYTES NFR BLD AUTO: 31 % (ref 13–45)
MCH RBC QN AUTO: 29 PG (ref 27–33.4)
MCHC RBC AUTO-ENTMCNC: 34.1 G/DL (ref 32–36)
MCV RBC AUTO: 85 FL (ref 80–97)
MONOCYTES # BLD AUTO: 0.5 10^3/UL (ref 0.1–1.4)
MONOCYTES NFR BLD AUTO: 6.4 % (ref 3–13)
NEUTROPHILS # BLD AUTO: 5 10^3/UL (ref 1.7–8.2)
NEUTS SEG NFR BLD AUTO: 59.2 % (ref 42–78)
NITRITE UR QL STRIP: NEGATIVE
PH UR STRIP: 5 [PH] (ref 5–9)
PLATELET # BLD: 213 10^3/UL (ref 150–450)
POTASSIUM SERPL-SCNC: 4.3 MMOL/L (ref 3.6–5)
PROT SERPL-MCNC: 7.6 G/DL (ref 6.3–8.2)
PROT UR STRIP-MCNC: NEGATIVE MG/DL
RBC # BLD AUTO: 4.84 10^6/UL (ref 3.72–5.28)
SODIUM SERPL-SCNC: 138.9 MMOL/L (ref 137–145)
SP GR UR STRIP: 1.02
TOTAL CELLS COUNTED % (AUTO): 100 %
UROBILINOGEN UR-MCNC: NEGATIVE MG/DL (ref ?–2)
WBC # BLD AUTO: 8.5 10^3/UL (ref 4–10.5)

## 2019-04-20 PROCEDURE — 81001 URINALYSIS AUTO W/SCOPE: CPT

## 2019-04-20 PROCEDURE — 99284 EMERGENCY DEPT VISIT MOD MDM: CPT

## 2019-04-20 PROCEDURE — 84443 ASSAY THYROID STIM HORMONE: CPT

## 2019-04-20 PROCEDURE — 36415 COLL VENOUS BLD VENIPUNCTURE: CPT

## 2019-04-20 PROCEDURE — 85025 COMPLETE CBC W/AUTO DIFF WBC: CPT

## 2019-04-20 PROCEDURE — 70450 CT HEAD/BRAIN W/O DYE: CPT

## 2019-04-20 PROCEDURE — 80053 COMPREHEN METABOLIC PANEL: CPT

## 2019-04-20 NOTE — RADIOLOGY REPORT (SQ)
EXAM DESCRIPTION: 



CT HEAD WITHOUT IV CONTRAST



COMPLETED DATE/TME:  04/20/2019 22:16



CLINICAL HISTORY: 



54 years, Female, facial paresthesias



COMPARISON:

Prior CT brain 4/30/2018



TECHNIQUE:

194  Images stored on PACS.

 

All CT scanners at this facility use dose modulation, iterative

reconstruction, and/or weight based dosing when appropriate to

reduce radiation dose to as low as reasonably achievable (ALARA).





[facility]: Dose Right [facility]: CareDose   [facility]: Dose Right    [facility]:

Teradose 4D    [facility]: Smart Technologies



LIMITATIONS:

None.



FINDINGS:



The globes, paranasal sinuses, mastoid air cells are

unremarkable. No displaced or depressed skull fracture. No intra

or extra-axial hemorrhage. CT is limited for evaluation of acute

infarct. No CT evidence for large or territorial acute infarct.

No mass or midline shift. Hyperostosis frontalis interna.





IMPRESSION:



Negative for acute intracranial abnormality

 

TECHNICAL DOCUMENTATION:



Quality ID # 436: Final reports with documentation of one or more

dose reduction techniques (e.g., Automated exposure control,

adjustment of the mA and/or kV according to patient size, use of

iterative reconstruction technique)



copyright 2011 [employer]- All Rights Reserved

## 2019-04-20 NOTE — ER DOCUMENT REPORT
ED Medical Screen (RME)





- General


Chief Complaint: Dizziness


Stated Complaint: DIZZY,FACE NUMB/TINGLING


Time Seen by Provider: 04/20/19 20:05


Primary Care Provider: 


PATRICIA MCKEON MD [Primary Care Provider] - Follow up as needed


Mode of Arrival: Wheelchair


Information source: Patient


Notes: 





54-year-old female presented to ED for tingling to her lips about 6 PM.  States 

that then went to her tongue.  She states she has had waves of lightheadedness. 

She states the lightheadedness is gotten worse.  She went to urgent care and 

they sent her to the emergency room to be evaluated for dizziness and she would 

need a CT and a neural surgeon consult.  Patient states this scared her so she 

came to the emergency room.  Patient has a history of anxiety depression PTSD 

asthma pneumonia reflux and a fractured ankle.  She has had an appendectomy 

hysterectomy ankle surgery in the past.  She states she does have congestion and

nasal drip.  Patient is alert oriented respirations regular and unlabored lungs 

are clear to auscultation.  She has no gross neurologic deficits.  Pupils are 

equal and react to light.  Patient has equal  no extremity drifting.  Equal

strength in arms bilateral legs bilaterally.  Had patient stand up to see if she

can ambulate and she said she was still very dizzy.  She states she did not feel

like she was Ramila but she was still very dizzy.











I have greeted and performed a rapid initial assessment of this patient.  A 

comprehensive ED assessment and evaluation of the patient, analysis of test 

results and completion of medical decision making process will be conducted by 

an additional ED providers.


TRAVEL OUTSIDE OF THE [place] IN LAST 30 DAYS: No





- Related Data


Allergies/Adverse Reactions: 


                                        





morphine Adverse Reaction (Mild, Verified 04/20/19 20:03)


   











Past Medical History





- Past Medical History


Cardiac Medical History: Reports: Hx Hypercholesterolemia, Hx Hypertension


Pulmonary Medical History: Reports: Hx Asthma


Renal/ Medical History: Denies: Hx Peritoneal Dialysis


GI Medical History: Reports: Hx Gastroesophageal Reflux Disease


Psychiatric Medical History: Reports: Hx Anxiety, Hx Bipolar Disorder, Hx 

Depression


Past Surgical History: Reports: Hx Appendectomy, Hx Hysterectomy





- Immunizations


Hx Diphtheria, Pertussis, Tetanus Vaccination:  - UNKNOWN





Physical Exam





- Vital signs


Vitals: 





                                        











Temp Pulse Resp BP Pulse Ox


 


 98.2 F   68   18   150/89 H  98 


 


 04/20/19 20:00  04/20/19 20:00  04/20/19 20:00  04/20/19 20:00  04/20/19 20:00














Course





- Vital Signs


Vital signs: 





                                        











Temp Pulse Resp BP Pulse Ox


 


 98.2 F   68   18   150/89 H  98 


 


 04/20/19 20:00  04/20/19 20:00  04/20/19 20:00  04/20/19 20:00  04/20/19 20:00














Doctor's Discharge





- Discharge


Referrals: 


PATRICIA MCKEON MD [Primary Care Provider] - Follow up as needed

## 2019-04-20 NOTE — ER DOCUMENT REPORT
ED General





- General


Chief Complaint: Dizziness


Stated Complaint: DIZZY,FACE NUMB/TINGLING


Time Seen by Provider: 04/20/19 20:05


Primary Care Provider: 


PATRICIA MCKEON MD [Primary Care Provider] - Follow up in 3-5 days


Mode of Arrival: Wheelchair


Notes: 





Patient is a 54 year old female that presents to the emergency department for 

chief complaint of facial tingling.  Patient states that this started probably 

around 6:00 today has been constant since then, she is had sinus drainage recent

ly and sinus pressure, thinks it may be related to that.  She denies any any 

fevers associated with this.  She has had some pressure she states behind her 

eyes as well and felt somewhat lightheaded, denies having any dizziness or 

vertigo symptoms.  Denies any numbness, weakness or tingling in any of her 

extremities.  She states that the paresthesias she is having on her face is both

sides.  She denies any facial droop, visual change, or difficulty speaking.  She

reports somewhat similar symptoms in the past where she has had tingling across 

her forehead when she has had sinus pressures.  She states she is been having 

postnasal drip recently as well with a mild cough, but denies any shortness of 

breath, chest pain or difficulty breathing.





Past Medical History: Hypothyroidism, hyperlipidemia, depression


Past Surgical History: Appendectomy, hysterectomy


Social History: Denies tobacco, alcohol or drug use.


Family History: Reviewed and noncontributory for presenting illness


Allergies: Reviewed, see documented allergy list. 





REVIEW OF SYSTEMS:


Other than noted above, the 12 point review of systems was reviewed with the 

patient and were negative, all pertinent findings are included in the HPI.





PHYSICAL EXAMINATION:





Vital signs reviewed, nursing noted reviewed. 





GENERAL: Well-appearing, well-nourished and in no acute distress.





HEAD: Atraumatic, normocephalic.





EYES: Eyes appear normal, extraocular movements intact, sclera anicteric, 

conjunctiva are normal.





ENT: nares patent, oropharynx clear without exudates.  Moist mucous membranes.  

Mild bilateral nasal turbinate injection.  No drainage.





NECK: Normal range of motion, supple without lymphadenopathy





LUNGS: Breath sounds clear to auscultation bilaterally and equal.  No wheezes 

rales or rhonchi.





HEART: Regular rate and rhythm without murmurs





ABDOMEN: Soft, nontender, normoactive bowel sounds.  No rebound, guarding, or 

rigidity. No masses appreciated.





EXTREMITIES: Nontender, good range of motion, no pitting or edema.  





NEUROLOGICAL: Cranial nerves tested, intact, normal and equal sensation 

bilaterally to the face, no facial droop, no dysarthria, muscular motor strength

is +5/5 in all extremities, sensation intact and equal bilaterally in all 

extremities.  Finger-nose-finger testing bilaterally, heel shin testing normal 

bilaterally.  No focal neurological deficits. Moves all extremities 

spontaneously Motor and sensory grossly intact on exam.





PSYCH: Normal mood, normal affect.





SKIN: Warm, Dry, normal turgor, no rashes or lesions noted on exposed skin





TRAVEL OUTSIDE OF THE [place] IN LAST 30 DAYS: No





- Related Data


Allergies/Adverse Reactions: 


                                        





morphine Adverse Reaction (Mild, Verified 04/20/19 20:03)


   











Past Medical History





- General


Information source: Patient





- Social History


Smoking Status: Former Smoker


Family History: CAD - Parents and grandparents in their 60s., CVA, Hypertension,

Other - Hypercoagulable state with blood clots


Patient has suicidal ideation: No


Patient has homicidal ideation: No





- Past Medical History


Cardiac Medical History: Reports: Hx Hypercholesterolemia, Hx Hypertension


Pulmonary Medical History: Reports: Hx Asthma


Renal/ Medical History: Denies: Hx Peritoneal Dialysis


GI Medical History: Reports: Hx Gastroesophageal Reflux Disease


Psychiatric Medical History: Reports: Hx Anxiety, Hx Bipolar Disorder, Hx 

Depression


Past Surgical History: Reports: Hx Appendectomy, Hx Hysterectomy





- Immunizations


Hx Diphtheria, Pertussis, Tetanus Vaccination:  - UNKNOWN





Physical Exam





- Vital signs


Vitals: 


                                        











Temp Pulse Resp BP Pulse Ox


 


 98.2 F   68   18   150/89 H  98 


 


 04/20/19 20:00  04/20/19 20:00  04/20/19 20:00  04/20/19 20:00  04/20/19 20:00














Course





- Re-evaluation


Re-evalutation: 





Patient seen and examined vital signs reviewed. 





Laboratory data and/or imaging were ordered as appropriate for the patient's 

presenting symptoms and complaint, with consideration of any critical or life 

threatening conditions that may be associated with their obtained history and 

exam as noted above.





Results were reviewed when available and demonstrated essentially unremarkable 

blood work with the exception of her TSH being 10, she states she recently had 

blood work with her primary care and believes it was tested, she will follow-up 

with them regarding a change in her levothyroxine dosing, she currently takes 

100 mcg.  A CT of her head was negative for any acute intracranial abn

ormalities.





The patient was re-evaluated and was stable, still having some facial 

paresthesias, but no numbness, neuro exam was unchanged.  And essentially 

benign.





Evaluation was most consistent with facial paresthesias, I will prescribe the 

patient Flonase that she is been having sinus congestion and postnasal drip 

recently to see if this will help with the facial paresthesias she is having as 

well.





Results were discussed with the patient at this point, after careful 

consideration I feel that that patient can be discharged from the emergency 

department, the patient was educated treatments and reasons to return to the 

emergency department based on their presumed diagnosis as noted above, they were

advised to followup with a primary care physician in 2-3 days. Patient was 

agreeable to plan of care.





*Note is created using voice recognition software and may contain spelling, 

syntax or grammatical errors.








Laboratory











  04/20/19 04/20/19 04/20/19





  20:24 20:24 20:24


 


WBC  8.5  


 


RBC  4.84  


 


Hgb  14.0  


 


Hct  41.2  


 


MCV  85  


 


MCH  29.0  


 


MCHC  34.1  


 


RDW  13.4  


 


Plt Count  213  


 


Seg Neutrophils %  59.2  


 


Lymphocytes %  31.0  


 


Monocytes %  6.4  


 


Eosinophils %  2.1  


 


Basophils %  1.3  


 


Absolute Neutrophils  5.0  


 


Absolute Lymphocytes  2.6  


 


Absolute Monocytes  0.5  


 


Absolute Eosinophils  0.2  


 


Absolute Basophils  0.1  


 


Sodium   Cancelled 


 


Potassium   Cancelled 


 


Chloride   Cancelled 


 


Carbon Dioxide   Cancelled 


 


Anion Gap   Cancelled 


 


BUN   Cancelled 


 


Creatinine   Cancelled 


 


Est GFR ( Amer)   Cancelled 


 


Est GFR (Non-Af Amer)   Cancelled 


 


Glucose   Cancelled 


 


Calcium   Cancelled 


 


Total Bilirubin   Cancelled 


 


Direct Bilirubin   Cancelled 


 


Neonat Total Bilirubin   Cancelled 


 


Neonat Direct Bilirubin   Cancelled 


 


Neonat Indirect Bili   Cancelled 


 


AST   Cancelled 


 


ALT   Cancelled 


 


Alkaline Phosphatase   Cancelled 


 


Total Protein   Cancelled 


 


Albumin   Cancelled 


 


TSH   


 


Urine Color    YELLOW


 


Urine Appearance    CLEAR


 


Urine pH    5.0


 


Ur Specific Gravity    1.017


 


Urine Protein    NEGATIVE


 


Urine Glucose (UA)    NEGATIVE


 


Urine Ketones    NEGATIVE


 


Urine Blood    NEGATIVE


 


Urine Nitrite    NEGATIVE


 


Urine Bilirubin    NEGATIVE


 


Urine Urobilinogen    NEGATIVE


 


Ur Leukocyte Esterase    TRACE H


 


Urine WBC (Auto)    2


 


Urine RBC (Auto)    2


 


Squamous Epi Cells Auto    2


 


Urine Mucus (Auto)    RARE


 


Urine Ascorbic Acid    NEGATIVE














  04/20/19 04/20/19





  21:10 21:10


 


WBC  


 


RBC  


 


Hgb  


 


Hct  


 


MCV  


 


MCH  


 


MCHC  


 


RDW  


 


Plt Count  


 


Seg Neutrophils %  


 


Lymphocytes %  


 


Monocytes %  


 


Eosinophils %  


 


Basophils %  


 


Absolute Neutrophils  


 


Absolute Lymphocytes  


 


Absolute Monocytes  


 


Absolute Eosinophils  


 


Absolute Basophils  


 


Sodium  138.9 


 


Potassium  4.3 


 


Chloride  99 


 


Carbon Dioxide  31 H 


 


Anion Gap  9 


 


BUN  15 


 


Creatinine  1.00 


 


Est GFR ( Amer)  > 60 


 


Est GFR (Non-Af Amer)  58 L 


 


Glucose  121 H 


 


Calcium  10.5 H 


 


Total Bilirubin  0.7 


 


Direct Bilirubin  0.3 


 


Neonat Total Bilirubin  Not Reportable 


 


Neonat Direct Bilirubin  Not Reportable 


 


Neonat Indirect Bili  Not Reportable 


 


AST  16 


 


ALT  28 


 


Alkaline Phosphatase  85 


 


Total Protein  7.6 


 


Albumin  4.6 


 


TSH   10.40 H


 


Urine Color  


 


Urine Appearance  


 


Urine pH  


 


Ur Specific Gravity  


 


Urine Protein  


 


Urine Glucose (UA)  


 


Urine Ketones  


 


Urine Blood  


 


Urine Nitrite  


 


Urine Bilirubin  


 


Urine Urobilinogen  


 


Ur Leukocyte Esterase  


 


Urine WBC (Auto)  


 


Urine RBC (Auto)  


 


Squamous Epi Cells Auto  


 


Urine Mucus (Auto)  


 


Urine Ascorbic Acid  











                                        





Head CT  04/20/19 22:16


IMPRESSION:


 


Negative for acute intracranial abnormality


 


TECHNICAL DOCUMENTATION:


 


Quality ID # 436: Final reports with documentation of one or more


dose reduction techniques (e.g., Automated exposure control,


adjustment of the mA and/or kV according to patient size, use of


iterative reconstruction technique)


 


copyright 2011 [employer]- All Rights Reserved


 

















- Vital Signs


Vital signs: 


                                        











Temp Pulse Resp BP Pulse Ox


 


 98.2 F   68   18   150/89 H  98 


 


 04/20/19 20:00  04/20/19 20:00  04/20/19 20:00  04/20/19 20:00  04/20/19 20:00














- Laboratory


Result Diagrams: 


                                 04/20/19 20:24





                                 04/20/19 21:10


Laboratory results interpreted by me: 


                                        











  04/20/19 04/20/19 04/20/19





  20:24 21:10 21:10


 


Carbon Dioxide   31 H 


 


Est GFR (Non-Af Amer)   58 L 


 


Glucose   121 H 


 


Calcium   10.5 H 


 


TSH    10.40 H


 


Ur Leukocyte Esterase  TRACE H  














Discharge





- Discharge


Clinical Impression: 


 Facial paresthesia





Condition: Stable


Disposition: HOME, SELF-CARE


Instructions:  Numbness or Paresthesia ([facility])


Additional Instructions: 


Please follow-up with your primary care physician, I suggest using some Flonase,

for your sinus congestion, at least for the next 2 weeks.  Your thyroid testing,

demonstrated an elevated TSH, which is the screening test for thyroid disease, 

it was elevated, which is actually indicative that your thyroid function is low 

at this time, he will need to follow-up with your primary care physician, to 

discuss increasing your thyroid medication.


Prescriptions: 


Fluticasone Propionate [Flonase Nasal Spray 50 Mcg/Spray 16 gm] 1 spray NASL Q12

#1 inhaler


Referrals: 


PATRICIA MCKEON MD [Primary Care Provider] - Follow up in 3-5 days

## 2019-06-07 ENCOUNTER — HOSPITAL ENCOUNTER (EMERGENCY)
Dept: HOSPITAL 62 - ER | Age: 54
Discharge: HOME | End: 2019-06-07
Payer: COMMERCIAL

## 2019-06-07 VITALS — DIASTOLIC BLOOD PRESSURE: 89 MMHG | SYSTOLIC BLOOD PRESSURE: 148 MMHG

## 2019-06-07 DIAGNOSIS — I10: ICD-10-CM

## 2019-06-07 DIAGNOSIS — Z87.891: ICD-10-CM

## 2019-06-07 DIAGNOSIS — R10.2: ICD-10-CM

## 2019-06-07 DIAGNOSIS — Z90.710: ICD-10-CM

## 2019-06-07 DIAGNOSIS — J45.909: ICD-10-CM

## 2019-06-07 DIAGNOSIS — N83.292: Primary | ICD-10-CM

## 2019-06-07 DIAGNOSIS — Z90.49: ICD-10-CM

## 2019-06-07 LAB
ADD MANUAL DIFF: NO
ALBUMIN SERPL-MCNC: 4.5 G/DL (ref 3.5–5)
ALP SERPL-CCNC: 76 U/L (ref 38–126)
ALT SERPL-CCNC: 21 U/L (ref 9–52)
ANION GAP SERPL CALC-SCNC: 10 MMOL/L (ref 5–19)
APPEARANCE UR: (no result)
APTT PPP: YELLOW S
AST SERPL-CCNC: 19 U/L (ref 14–36)
BASOPHILS # BLD AUTO: 0.1 10^3/UL (ref 0–0.2)
BASOPHILS NFR BLD AUTO: 1.3 % (ref 0–2)
BILIRUB DIRECT SERPL-MCNC: 0.2 MG/DL (ref 0–0.4)
BILIRUB SERPL-MCNC: 0.7 MG/DL (ref 0.2–1.3)
BILIRUB UR QL STRIP: NEGATIVE
BUN SERPL-MCNC: 12 MG/DL (ref 7–20)
CALCIUM: 9.6 MG/DL (ref 8.4–10.2)
CHLORIDE SERPL-SCNC: 104 MMOL/L (ref 98–107)
CO2 SERPL-SCNC: 26 MMOL/L (ref 22–30)
EOSINOPHIL # BLD AUTO: 0.1 10^3/UL (ref 0–0.6)
EOSINOPHIL NFR BLD AUTO: 1.8 % (ref 0–6)
ERYTHROCYTE [DISTWIDTH] IN BLOOD BY AUTOMATED COUNT: 13 % (ref 11.5–14)
GLUCOSE SERPL-MCNC: 132 MG/DL (ref 75–110)
GLUCOSE UR STRIP-MCNC: NEGATIVE MG/DL
HCT VFR BLD CALC: 41.4 % (ref 36–47)
HGB BLD-MCNC: 13.8 G/DL (ref 12–15.5)
KETONES UR STRIP-MCNC: (no result) MG/DL
LYMPHOCYTES # BLD AUTO: 1.7 10^3/UL (ref 0.5–4.7)
LYMPHOCYTES NFR BLD AUTO: 22.5 % (ref 13–45)
MCH RBC QN AUTO: 28 PG (ref 27–33.4)
MCHC RBC AUTO-ENTMCNC: 33.4 G/DL (ref 32–36)
MCV RBC AUTO: 84 FL (ref 80–97)
MONOCYTES # BLD AUTO: 0.6 10^3/UL (ref 0.1–1.4)
MONOCYTES NFR BLD AUTO: 8.1 % (ref 3–13)
NEUTROPHILS # BLD AUTO: 4.9 10^3/UL (ref 1.7–8.2)
NEUTS SEG NFR BLD AUTO: 66.3 % (ref 42–78)
NITRITE UR QL STRIP: NEGATIVE
PH UR STRIP: 5 [PH] (ref 5–9)
PLATELET # BLD: 200 10^3/UL (ref 150–450)
POTASSIUM SERPL-SCNC: 4.2 MMOL/L (ref 3.6–5)
PROT SERPL-MCNC: 7.2 G/DL (ref 6.3–8.2)
PROT UR STRIP-MCNC: NEGATIVE MG/DL
RBC # BLD AUTO: 4.94 10^6/UL (ref 3.72–5.28)
SP GR UR STRIP: 1.02
TOTAL CELLS COUNTED % (AUTO): 100 %
UROBILINOGEN UR-MCNC: NEGATIVE MG/DL (ref ?–2)
WBC # BLD AUTO: 7.4 10^3/UL (ref 4–10.5)

## 2019-06-07 PROCEDURE — 76830 TRANSVAGINAL US NON-OB: CPT

## 2019-06-07 PROCEDURE — 81001 URINALYSIS AUTO W/SCOPE: CPT

## 2019-06-07 PROCEDURE — 96374 THER/PROPH/DIAG INJ IV PUSH: CPT

## 2019-06-07 PROCEDURE — 87086 URINE CULTURE/COLONY COUNT: CPT

## 2019-06-07 PROCEDURE — 93976 VASCULAR STUDY: CPT

## 2019-06-07 PROCEDURE — 99284 EMERGENCY DEPT VISIT MOD MDM: CPT

## 2019-06-07 PROCEDURE — 85025 COMPLETE CBC W/AUTO DIFF WBC: CPT

## 2019-06-07 PROCEDURE — 36415 COLL VENOUS BLD VENIPUNCTURE: CPT

## 2019-06-07 PROCEDURE — 80053 COMPREHEN METABOLIC PANEL: CPT

## 2019-06-07 NOTE — RADIOLOGY REPORT (SQ)
EXAM DESCRIPTION:  U/S NON OB PEL TV W/DOPPLER



COMPLETED DATE/TIME:  6/7/2019 4:58 pm



REASON FOR STUDY:  pelvic pain



COMPARISON:  None.



TECHNIQUE:  Dynamic and static grayscale images acquired of the pelvis via transvaginal approach and 
recorded on PACS. Additional selected color Doppler and spectral images recorded.



LIMITATIONS:  None.



FINDINGS:  UTERUS: Surgically absent.

ENDOMETRIAL STRIPE: Not applicable.

CERVIX: Not applicable.

RIGHT OVARY AND DOPPLER: Obscured by overlying bowel gas.

LEFT OVARY AND DOPPLER: Normal size. No worrisome masses. Normal arterial vascular flow without evide
nce for torsion.  2.2 x 1.6 x 3.3 cm Set to cyst.

FREE FLUID: None noted.

OTHER: No other significant finding.

MEASUREMENTS:

UTERUS: Not applicable.

ENDOMETRIAL STRIPE: Not applicable.

RIGHT OVARY: Ovary not seen.

LEFT OVARY: 3.8 x 2.1 x 3.4 cm.



IMPRESSION:  3.3 cm septated left ovarian cyst.  Recommend surgical evaluation.



TECHNICAL DOCUMENTATION:  JOB ID:  [id number]

 2011 [employer]- All Rights Reserved                          [id number]



Reading location - IP/workstation name: [email or web address]

## 2019-06-07 NOTE — ER DOCUMENT REPORT
ED Medical Screen (RME)





- General


Chief Complaint: Pelvic Pain


Stated Complaint: PELVIC PAIN


Time Seen by Provider: 06/07/19 15:40


Primary Care Provider: 


PATRICIA MCKEON MD [Primary Care Provider] - Follow up as needed


Notes: 





Patient is a 54-year-old female presents to the emergency room with pelvic pain.

 States she has a history of a partial hysterectomy.  States she had generalized

suprapubic and pelvic pain for the last 3 days.  States she went to an urgent 

care who told her that her urine was not infected.  They states she may have an 

ovarian cyst but they did not have any way to do an ultrasound.





Patient is denying any vaginal bleeding, discharge, malodorous smells.


Patient is denying any dysuria.





GENERAL: Alert, interacts well. No acute distress.


ABDOMEN: Soft, non-tender. Non-distended. Bowel sounds present in all 4 

quadrants.


Pelvic pain noted bilaterally, suprapubic pain








I have greeted and performed a rapid initial assessment of this patient.  A 

comprehensive ED assessment and evaluation of the patient, analysis of test 

results and completion of the medical decision making process will be conducted 

by additional ED providers.





This medical record was dictated with voice recognizing software.  There may be 

grammatical, syntax errors that are unintended.


TRAVEL OUTSIDE OF THE [place] IN LAST 30 DAYS: No





- Related Data


Allergies/Adverse Reactions: 


                                        





No Known Allergies Allergy (Unverified 06/07/19 15:10)


   











Past Medical History





- General


Last Menstrual Period: n/a





- Social History


Chew tobacco use (# tins/day): No


Frequency of alcohol use: None


Drug Abuse: None





- Past Medical History


Cardiac Medical History: Reports: Hx Hypercholesterolemia, Hx Hypertension


Pulmonary Medical History: Reports: Hx Asthma


Renal/ Medical History: Denies: Hx Peritoneal Dialysis


GI Medical History: Reports: Hx Gastroesophageal Reflux Disease


Psychiatric Medical History: Reports: Hx Anxiety, Hx Bipolar Disorder, Hx 

Depression


Past Surgical History: Reports: Hx Appendectomy, Hx Hysterectomy





- Immunizations


Hx Diphtheria, Pertussis, Tetanus Vaccination:  - UNKNOWN





Physical Exam





- Vital signs


Vitals: 





                                        











Temp Pulse Resp BP Pulse Ox


 


 98.6 F   81   20   153/88 H  94 


 


 06/07/19 15:22  06/07/19 15:22  06/07/19 15:22  06/07/19 15:22  06/07/19 15:22














Course





- Vital Signs


Vital signs: 





                                        











Temp Pulse Resp BP Pulse Ox


 


 98.6 F   81   20   153/88 H  94 


 


 06/07/19 15:22  06/07/19 15:22  06/07/19 15:22  06/07/19 15:22  06/07/19 15:22














Doctor's Discharge





- Discharge


Referrals: 


PATRICIA MCKEON MD [Primary Care Provider] - Follow up as needed

## 2019-06-07 NOTE — ER DOCUMENT REPORT
ED General





- General


Chief Complaint: Pelvic Pain


Stated Complaint: PELVIC PAIN


Time Seen by Provider: 06/07/19 15:40


Primary Care Provider: 


PATRICIA MCKEON MD [Primary Care Provider] - Follow up as needed


Mode of Arrival: Ambulatory


Information source: Patient


TRAVEL OUTSIDE OF THE [place] IN LAST 30 DAYS: No





- HPI


Patient complains to provider of: Pelvic pain


Onset: Other - 4 days ago


Onset/Duration: Sudden


Quality of pain: Sharp


Severity: Severe


Pain Level: 4


Associated symptoms: None


Exacerbated by: Denies


Relieved by: Denies


Similar symptoms previously: No


Recently seen / treated by doctor: No


Notes: 


54-year-old  female coming in today with pelvic pain.  This started 

spontaneously 4 days ago.  Interestingly enough, patient does not have a uterus 

secondary to endometriosis in the past.  She was seen in the walk-in clinic.  

They did not find any UTI.  Told her she could have a cyst on her ovary.





- Related Data


Allergies/Adverse Reactions: 


                                        





No Known Allergies Allergy (Unverified 06/07/19 15:10)


   











Past Medical History





- General


Information source: Patient


Last Menstrual Period: n/a





- Social History


Smoking Status: Former Smoker


Chew tobacco use (# tins/day): No


Frequency of alcohol use: None


Drug Abuse: None


Family History: CAD - Parents and grandparents in their 60s., CVA, Hypertension,

Other - Hypercoagulable state with blood clots


Patient has suicidal ideation: No


Patient has homicidal ideation: No





- Past Medical History


Cardiac Medical History: Reports: Hx Hypercholesterolemia, Hx Hypertension


Pulmonary Medical History: Reports: Hx Asthma


Renal/ Medical History: Denies: Hx Peritoneal Dialysis


GI Medical History: Reports: Hx Gastroesophageal Reflux Disease


Psychiatric Medical History: Reports: Hx Anxiety, Hx Bipolar Disorder, Hx 

Depression


Past Surgical History: Reports: Hx Appendectomy, Hx Cholecystectomy, Hx 

Hysterectomy





- Immunizations


Hx Diphtheria, Pertussis, Tetanus Vaccination:  - UNKNOWN





Review of Systems





- Review of Systems


Notes: 





Constitutional:  No fevers. No chills.





EENT: No eye redness. No eye pain. No ear pain. No sore throat.





Cardiovascular:  No chest pain. No palpitations.





Respiratory: No cough. No shortness of breath. No respiratory distress.





Gastrointestinal: No abdominal pain. No nausea, vomiting, or diarrhea.





Genitourinary: Positive for pelvic pain





Musculoskeletal: Atraumatic. No swelling. No deformities.





Skin: No rash or lesions.





Lymphatic: No swollen lymph nodes.





Neurologic: No headache. No syncope.





Psychiatric: No suicidal or homicidal ideation.





Physical Exam





- Vital signs


Vitals: 





                                        











Temp Pulse Resp BP Pulse Ox


 


 98.6 F   81   20   153/88 H  94 


 


 06/07/19 15:22  06/07/19 15:22  06/07/19 15:22  06/07/19 15:22  06/07/19 15:22














- Notes


Notes: 





General: Well-developed, well-nourished. In no acute distress. Non-toxic 

appearing.





Cardiac: Well-perfused. Regular rate and rhythm. No murmurs, rubs, or gallops. 





Pulmonary: No respiratory distress. No cyanosis. Bilateral lung fiels are clear 

to auscultation.





Abdominal: Non-distended. Non-rigid. Bowels sounds are present in all four 

quadrants. No guarding or rebound.





HEENT: Head is atraumatic. Conjunctivae not reddened. No tearing. PERRL. EOMI. 

Orbits atraumatic. No periorbital swelling or erythema. Oropharynx is without 

erythema, swelling, or exudates.





Neck: Supple. No adenopathy. No meningismus.





Dermatologic: Warm with good turgor. No rash. Atraumatic.





Chest: Atraumatic. No chest wall tenderness to palpation.





Musculoskeletal: Moves all extremities well. No range of motion deficits. no mu

scular or joint tenderness. No paraspinal muscle tenderness. no midline spinal 

tenderness or step-off.





Genitourinary: Examination deferred





Neurologic: No gross neurologic deficits.





Psychiatric: Normal mood. 











Course





- Re-evaluation


Re-evalutation: 





06/07/19 17:46


Septated 3 cm ovarian cyst left side.


06/07/19 17:48








- Vital Signs


Vital signs: 





                                        











Temp Pulse Resp BP Pulse Ox


 


 98.6 F   81   20   153/88 H  94 


 


 06/07/19 15:22  06/07/19 15:22  06/07/19 15:22  06/07/19 15:22  06/07/19 15:22














- Laboratory


Result Diagrams: 


                                 06/07/19 16:10





                                 06/07/19 16:10


Laboratory results interpreted by me: 





                                        











  06/07/19 06/07/19





  16:10 16:10


 


Glucose  132 H 


 


Urine Ketones   TRACE H


 


Ur Leukocyte Esterase   MODERATE H














Discharge





- Discharge


Clinical Impression: 


Ovarian cyst


Qualifiers:


 Laterality: right Qualified Code(s): N83.201 - Unspecified ovarian cyst, right 

side





Condition: Good


Disposition: HOME, SELF-CARE


Instructions:  Ovarian Cyst ([facility])


Prescriptions: 


Hydrocodone/Acetaminophen [Norco 5-325 mg Tablet] 1 tab PO Q6HP PRN #12 tablet


 PRN Reason: 


Referrals: 


EILEEN VILLANUEVA MD [ACTIVE STAFF] - Follow up as needed

## 2022-12-29 ENCOUNTER — HOSPITAL ENCOUNTER (EMERGENCY)
Age: 57
Discharge: LEFT BEFORE DISCHARGE | End: 2022-12-29

## 2022-12-29 ENCOUNTER — APPOINTMENT (OUTPATIENT)
Dept: GENERAL RADIOLOGY | Age: 57
End: 2022-12-29
Attending: STUDENT IN AN ORGANIZED HEALTH CARE EDUCATION/TRAINING PROGRAM

## 2022-12-29 VITALS
DIASTOLIC BLOOD PRESSURE: 78 MMHG | HEIGHT: 70 IN | OXYGEN SATURATION: 97 % | HEART RATE: 74 BPM | TEMPERATURE: 98.4 F | SYSTOLIC BLOOD PRESSURE: 122 MMHG | WEIGHT: 195 LBS | RESPIRATION RATE: 22 BRPM | BODY MASS INDEX: 27.92 KG/M2

## 2022-12-29 LAB
ALBUMIN SERPL-MCNC: 3.5 G/DL (ref 3.5–5)
ALBUMIN/GLOB SERPL: 0.9 {RATIO} (ref 1.1–2.2)
ALP SERPL-CCNC: 184 U/L (ref 45–117)
ALT SERPL-CCNC: 134 U/L (ref 12–78)
ANION GAP SERPL CALC-SCNC: 5 MMOL/L (ref 5–15)
AST SERPL-CCNC: 53 U/L (ref 15–37)
BASOPHILS # BLD: 0.1 K/UL (ref 0–0.1)
BASOPHILS NFR BLD: 1 % (ref 0–1)
BILIRUB SERPL-MCNC: 0.7 MG/DL (ref 0.2–1)
BUN SERPL-MCNC: 11 MG/DL (ref 6–20)
BUN/CREAT SERPL: 15 (ref 12–20)
CALCIUM SERPL-MCNC: 9.7 MG/DL (ref 8.5–10.1)
CHLORIDE SERPL-SCNC: 106 MMOL/L (ref 97–108)
CO2 SERPL-SCNC: 29 MMOL/L (ref 21–32)
COVID-19 RAPID TEST, COVR: NOT DETECTED
CREAT SERPL-MCNC: 0.71 MG/DL (ref 0.55–1.02)
DIFFERENTIAL METHOD BLD: NORMAL
EOSINOPHIL # BLD: 0.1 K/UL (ref 0–0.4)
EOSINOPHIL NFR BLD: 1 % (ref 0–7)
ERYTHROCYTE [DISTWIDTH] IN BLOOD BY AUTOMATED COUNT: 13.5 % (ref 11.5–14.5)
FLUAV AG NPH QL IA: NEGATIVE
FLUBV AG NOSE QL IA: NEGATIVE
GLOBULIN SER CALC-MCNC: 3.8 G/DL (ref 2–4)
GLUCOSE SERPL-MCNC: 111 MG/DL (ref 65–100)
HCT VFR BLD AUTO: 42.3 % (ref 35–47)
HGB BLD-MCNC: 13.6 G/DL (ref 11.5–16)
IMM GRANULOCYTES # BLD AUTO: 0 K/UL (ref 0–0.04)
IMM GRANULOCYTES NFR BLD AUTO: 0 % (ref 0–0.5)
LYMPHOCYTES # BLD: 1.2 K/UL (ref 0.8–3.5)
LYMPHOCYTES NFR BLD: 15 % (ref 12–49)
MCH RBC QN AUTO: 28.3 PG (ref 26–34)
MCHC RBC AUTO-ENTMCNC: 32.2 G/DL (ref 30–36.5)
MCV RBC AUTO: 88.1 FL (ref 80–99)
MONOCYTES # BLD: 1 K/UL (ref 0–1)
MONOCYTES NFR BLD: 12 % (ref 5–13)
NEUTS SEG # BLD: 5.9 K/UL (ref 1.8–8)
NEUTS SEG NFR BLD: 71 % (ref 32–75)
NRBC # BLD: 0 K/UL (ref 0–0.01)
NRBC BLD-RTO: 0 PER 100 WBC
PLATELET # BLD AUTO: 219 K/UL (ref 150–400)
PMV BLD AUTO: 9.9 FL (ref 8.9–12.9)
POTASSIUM SERPL-SCNC: 4.7 MMOL/L (ref 3.5–5.1)
PROT SERPL-MCNC: 7.3 G/DL (ref 6.4–8.2)
RBC # BLD AUTO: 4.8 M/UL (ref 3.8–5.2)
SODIUM SERPL-SCNC: 140 MMOL/L (ref 136–145)
SOURCE, COVRS: NORMAL
TROPONIN-HIGH SENSITIVITY: 8 NG/L (ref 0–51)
WBC # BLD AUTO: 8.3 K/UL (ref 3.6–11)

## 2022-12-29 PROCEDURE — 80053 COMPREHEN METABOLIC PANEL: CPT

## 2022-12-29 PROCEDURE — 87804 INFLUENZA ASSAY W/OPTIC: CPT

## 2022-12-29 PROCEDURE — 85025 COMPLETE CBC W/AUTO DIFF WBC: CPT

## 2022-12-29 PROCEDURE — 71046 X-RAY EXAM CHEST 2 VIEWS: CPT

## 2022-12-29 PROCEDURE — 84484 ASSAY OF TROPONIN QUANT: CPT

## 2022-12-29 PROCEDURE — 36415 COLL VENOUS BLD VENIPUNCTURE: CPT

## 2022-12-29 PROCEDURE — 87635 SARS-COV-2 COVID-19 AMP PRB: CPT

## 2022-12-29 PROCEDURE — 93005 ELECTROCARDIOGRAM TRACING: CPT

## 2022-12-29 PROCEDURE — 75810000275 HC EMERGENCY DEPT VISIT NO LEVEL OF CARE

## 2022-12-30 LAB
ATRIAL RATE: 87 BPM
CALCULATED P AXIS, ECG09: 69 DEGREES
CALCULATED R AXIS, ECG10: 37 DEGREES
CALCULATED T AXIS, ECG11: 53 DEGREES
DIAGNOSIS, 93000: NORMAL
P-R INTERVAL, ECG05: 148 MS
Q-T INTERVAL, ECG07: 362 MS
QRS DURATION, ECG06: 82 MS
QTC CALCULATION (BEZET), ECG08: 435 MS
VENTRICULAR RATE, ECG03: 87 BPM